# Patient Record
Sex: FEMALE | Race: WHITE | NOT HISPANIC OR LATINO | URBAN - METROPOLITAN AREA
[De-identification: names, ages, dates, MRNs, and addresses within clinical notes are randomized per-mention and may not be internally consistent; named-entity substitution may affect disease eponyms.]

---

## 2018-03-17 ENCOUNTER — EMERGENCY (EMERGENCY)
Facility: HOSPITAL | Age: 48
LOS: 0 days | Discharge: ANOTHER HOSPITAL | End: 2018-03-18
Attending: EMERGENCY MEDICINE
Payer: COMMERCIAL

## 2018-03-17 VITALS
SYSTOLIC BLOOD PRESSURE: 186 MMHG | DIASTOLIC BLOOD PRESSURE: 107 MMHG | OXYGEN SATURATION: 98 % | HEIGHT: 61 IN | HEART RATE: 114 BPM | WEIGHT: 130.07 LBS | RESPIRATION RATE: 21 BRPM
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
## 2018-03-17 DIAGNOSIS — R45.851 SUICIDAL IDEATIONS: ICD-10-CM

## 2018-03-17 DIAGNOSIS — R69 ILLNESS, UNSPECIFIED: ICD-10-CM

## 2018-03-17 DIAGNOSIS — F32.9 MAJOR DEPRESSIVE DISORDER, SINGLE EPISODE, UNSPECIFIED: ICD-10-CM

## 2018-03-17 DIAGNOSIS — F11.11 OPIOID ABUSE, IN REMISSION: ICD-10-CM

## 2018-03-17 DIAGNOSIS — F33.2 MAJOR DEPRESSIVE DISORDER, RECURRENT SEVERE WITHOUT PSYCHOTIC FEATURES: ICD-10-CM

## 2018-03-17 DIAGNOSIS — F12.10 CANNABIS ABUSE, UNCOMPLICATED: ICD-10-CM

## 2018-03-17 DIAGNOSIS — S61.512A LACERATION WITHOUT FOREIGN BODY OF LEFT WRIST, INITIAL ENCOUNTER: ICD-10-CM

## 2018-03-17 LAB
ALBUMIN SERPL ELPH-MCNC: 3.9 G/DL — SIGNIFICANT CHANGE UP (ref 3.3–5)
ALP SERPL-CCNC: 79 U/L — SIGNIFICANT CHANGE UP (ref 40–120)
ALT FLD-CCNC: 27 U/L — SIGNIFICANT CHANGE UP (ref 12–78)
AMPHET UR-MCNC: NEGATIVE — SIGNIFICANT CHANGE UP
ANION GAP SERPL CALC-SCNC: 8 MMOL/L — SIGNIFICANT CHANGE UP (ref 5–17)
APAP SERPL-MCNC: <2 UG/ML — LOW (ref 10–30)
AST SERPL-CCNC: 21 U/L — SIGNIFICANT CHANGE UP (ref 15–37)
BARBITURATES UR SCN-MCNC: NEGATIVE — SIGNIFICANT CHANGE UP
BASOPHILS # BLD AUTO: 0.03 K/UL — SIGNIFICANT CHANGE UP (ref 0–0.2)
BASOPHILS NFR BLD AUTO: 0.2 % — SIGNIFICANT CHANGE UP (ref 0–2)
BENZODIAZ UR-MCNC: NEGATIVE — SIGNIFICANT CHANGE UP
BILIRUB SERPL-MCNC: 0.2 MG/DL — SIGNIFICANT CHANGE UP (ref 0.2–1.2)
BUN SERPL-MCNC: 6 MG/DL — LOW (ref 7–23)
CALCIUM SERPL-MCNC: 8.9 MG/DL — SIGNIFICANT CHANGE UP (ref 8.5–10.1)
CHLORIDE SERPL-SCNC: 107 MMOL/L — SIGNIFICANT CHANGE UP (ref 96–108)
CO2 SERPL-SCNC: 28 MMOL/L — SIGNIFICANT CHANGE UP (ref 22–31)
COCAINE METAB.OTHER UR-MCNC: NEGATIVE — SIGNIFICANT CHANGE UP
CREAT SERPL-MCNC: 0.74 MG/DL — SIGNIFICANT CHANGE UP (ref 0.5–1.3)
EOSINOPHIL # BLD AUTO: 0.08 K/UL — SIGNIFICANT CHANGE UP (ref 0–0.5)
EOSINOPHIL NFR BLD AUTO: 0.7 % — SIGNIFICANT CHANGE UP (ref 0–6)
ETHANOL SERPL-MCNC: <10 MG/DL — SIGNIFICANT CHANGE UP (ref 0–10)
GLUCOSE SERPL-MCNC: 65 MG/DL — LOW (ref 70–99)
HCG SERPL-ACNC: <1 MIU/ML — SIGNIFICANT CHANGE UP
HCG UR QL: NEGATIVE — SIGNIFICANT CHANGE UP
HCT VFR BLD CALC: 42.4 % — SIGNIFICANT CHANGE UP (ref 34.5–45)
HGB BLD-MCNC: 14.6 G/DL — SIGNIFICANT CHANGE UP (ref 11.5–15.5)
IMM GRANULOCYTES NFR BLD AUTO: 0.4 % — SIGNIFICANT CHANGE UP (ref 0–1.5)
LYMPHOCYTES # BLD AUTO: 2.88 K/UL — SIGNIFICANT CHANGE UP (ref 1–3.3)
LYMPHOCYTES # BLD AUTO: 23.9 % — SIGNIFICANT CHANGE UP (ref 13–44)
MCHC RBC-ENTMCNC: 30.8 PG — SIGNIFICANT CHANGE UP (ref 27–34)
MCHC RBC-ENTMCNC: 34.4 GM/DL — SIGNIFICANT CHANGE UP (ref 32–36)
MCV RBC AUTO: 89.5 FL — SIGNIFICANT CHANGE UP (ref 80–100)
METHADONE UR-MCNC: NEGATIVE — SIGNIFICANT CHANGE UP
MONOCYTES # BLD AUTO: 1.16 K/UL — HIGH (ref 0–0.9)
MONOCYTES NFR BLD AUTO: 9.6 % — SIGNIFICANT CHANGE UP (ref 2–14)
NEUTROPHILS # BLD AUTO: 7.86 K/UL — HIGH (ref 1.8–7.4)
NEUTROPHILS NFR BLD AUTO: 65.2 % — SIGNIFICANT CHANGE UP (ref 43–77)
NRBC # BLD: 0 /100 WBCS — SIGNIFICANT CHANGE UP (ref 0–0)
OPIATES UR-MCNC: NEGATIVE — SIGNIFICANT CHANGE UP
PCP SPEC-MCNC: SIGNIFICANT CHANGE UP
PCP UR-MCNC: NEGATIVE — SIGNIFICANT CHANGE UP
PLATELET # BLD AUTO: 300 K/UL — SIGNIFICANT CHANGE UP (ref 150–400)
POTASSIUM SERPL-MCNC: 4.2 MMOL/L — SIGNIFICANT CHANGE UP (ref 3.5–5.3)
POTASSIUM SERPL-SCNC: 4.2 MMOL/L — SIGNIFICANT CHANGE UP (ref 3.5–5.3)
PROT SERPL-MCNC: 7.9 GM/DL — SIGNIFICANT CHANGE UP (ref 6–8.3)
RBC # BLD: 4.74 M/UL — SIGNIFICANT CHANGE UP (ref 3.8–5.2)
RBC # FLD: 14 % — SIGNIFICANT CHANGE UP (ref 10.3–14.5)
SALICYLATES SERPL-MCNC: 5.7 MG/DL — SIGNIFICANT CHANGE UP (ref 2.8–20)
SODIUM SERPL-SCNC: 143 MMOL/L — SIGNIFICANT CHANGE UP (ref 135–145)
THC UR QL: POSITIVE — SIGNIFICANT CHANGE UP
WBC # BLD: 12.06 K/UL — HIGH (ref 3.8–10.5)
WBC # FLD AUTO: 12.06 K/UL — HIGH (ref 3.8–10.5)

## 2018-03-17 PROCEDURE — 99285 EMERGENCY DEPT VISIT HI MDM: CPT | Mod: 25

## 2018-03-17 PROCEDURE — 12002 RPR S/N/AX/GEN/TRNK2.6-7.5CM: CPT

## 2018-03-17 PROCEDURE — 90792 PSYCH DIAG EVAL W/MED SRVCS: CPT | Mod: GT

## 2018-03-17 NOTE — ED PROVIDER NOTE - OBJECTIVE STATEMENT
Pt is a 48 yo lady with a pmhx of depression on zoloft who presents to the ED after a suicidal attempt. Has been going through divorce and today cut her L. wrist. No hx of cutting before in the past. Tearful affect, does not respond to questioning regarding if this was suicidal attempt or just hurting herself. Is up to date on tdap. No weakness, no numbness or tingling of hand. Is R. handed.

## 2018-03-17 NOTE — ED BEHAVIORAL HEALTH ASSESSMENT NOTE - SUICIDE RISK FACTORS
Access to means (pills, firearms, etc.)/Hopelessness/Mood episode/Highly impulsive behavior/Substance abuse/dependence/Family history of suicide

## 2018-03-17 NOTE — ED BEHAVIORAL HEALTH ASSESSMENT NOTE - HPI (INCLUDE ILLNESS QUALITY, SEVERITY, DURATION, TIMING, CONTEXT, MODIFYING FACTORS, ASSOCIATED SIGNS AND SYMPTOMS)
48 y/o , unemployed,  female, domiciled with  and 15 year old son, with hx of depression, anxiety, substance abuse (opiates, heroin - on Suboxone currently, cannabis) and one OD on pills for which she was hospitalized at [facility], with family hx of completed suicide (paternal aunt shot herself), on Lexapro 20 mg/day prescribed by her PMD, not in any other treatment at this time, BIB EMS after she cut her wrist with a razor, requiring sutures, in setting of her marriage ending. Today pt and her  were at home, and as he was heading out the door to go to work, she yelled out to him that she had cut herself.    On exam pt presents sad and distraught, frequently tearful. States she and her  are undergoing a divorce. It was his decision to end the marriage and pt does not want to separate. Today she was crying and begged him to not leave her, and when he told her no, she impulsively took a razor and cut herself on her wrist. (Note: Pt's son was not at the home when this occurred.) Pt states she doesn't remember why she did this and that she can't remember the details. What she remembers is feeling profoundly sad and lonely. She does not think she was trying to kill herself thought and states she doesn't want to die. States she and her  of 19 years started having problems when she cheated on him twice about two years ago. Pt blames herself entirely, stating repeatedly that it is her fault and describing the  as "sweet" and very supportive. Endorses multiple depressive sx and states she is having "hard time functioning." She lost her job of 22 years last year and has not been able to find one yet. She is currently trying to obtain a job with [employer] but just failed one of the licensing exams. She's been complaint with Lexapro, which she's been on for 2 years now. She feels it had a noticeable effect when she first started taking it but feels it has lost its effect. She feels a medication change is necessary.     No current or prior hx of psychosis. Currently using marijuana intermittently, with last use being 2 days ago. Hx of abusing prescription pain pills and is now on Suboxone. Also hx of abusing EtOH but stopped using it altogether a year ago. When asked about prior suicide attempts. pt denies. When asked about the OD in 10/2016 (Xanax, Ambien, and possibly barbituates), which per records also occurred in similar setting (impending divorce), pt states she wasn't trying to kill herself and that she was just "trying to sleep."    Please see below collateral obtained by BT[facility] from pt's  (Kahlil Duarte ):  Collateral reports that patient was at baseline all day until he was preparing to leave for work. Collateral reports patient used a knife to cut her left wrist and required 6 stitches. Collateral reports patient “did this to get my attention but now her parents are involved so this should get better. Collateral denies concerns for patient safety and doesn’t believe patient is in need of hospitalization, collateral plans to  go forward with divorce and understands this stressor could increase patient symptoms. 46 y/o , unemployed,  female, domiciled with  and 15 year old son, with hx of depression, anxiety, substance abuse (opiates, heroin - on Suboxone currently, cannabis) and one OD on pills for which she was hospitalized at [facility], with family hx of completed suicide (paternal aunt shot herself), on Lexapro 20 mg/day prescribed by her PMD, not in any other treatment at this time, BIB EMS after she cut her wrist with a razor, requiring sutures, in setting of her marriage ending. Today pt and her  were at home, and as he was heading out the door to go to work, she yelled out to him that she had cut herself.    On exam pt presents sad and distraught, frequently tearful. States she and her  are undergoing a divorce. It was his decision to end the marriage and pt does not want to separate. Today she was crying and begged him to not leave her, and when he told her no, she impulsively took a razor and cut herself on her wrist. (Note: Pt's son was not at the home when this occurred.) Pt states she doesn't remember why she did this and that she can't remember the details. What she remembers is feeling profoundly sad and lonely. She does not think she was trying to kill herself though and emphasizes that she doesn't want to die. States she and her  of 19 years started having problems when she cheated on him twice about two years ago. Pt blames herself entirely, stating repeatedly that it is all her fault and describing the  as "sweet" and very supportive. Endorses multiple depressive sx and states she is having "hard time functioning." She lost her job of 22 years last year and has not been able to find one yet. She is currently trying to obtain a job with [employer] company but just failed one of the licensing exams. She's been complaint with Lexapro, which she's been on for 2 years now. She feels it had a noticeable effect when she first started taking it but feels it has lost its effect. She feels a medication change is necessary.     No current or prior hx of psychosis. Currently using marijuana intermittently, with last use being 2 days ago. Hx of abusing prescription pain pills and is now on Suboxone. Also hx of abusing EtOH but stopped using it altogether a year ago. When asked about prior suicide attempts. pt denies. When asked about the OD in 10/2016 (Xanax, Ambien, and possibly barbituates), which per records also occurred in similar setting (impending divorce), pt states she wasn't trying to kill herself and that she was just "trying to sleep."    Please see below collateral obtained by BTCM from pt's  (Kahlil Duarte ):  Collateral reports that patient was at baseline all day until he was preparing to leave for work. Collateral reports patient used a knife to cut her left wrist and required 6 stitches. Collateral reports patient “did this to get my attention but now her parents are involved so this should get better. Collateral denies concerns for patient safety and doesn’t believe patient is in need of hospitalization, collateral plans to  go forward with divorce and understands this stressor could increase patient symptoms.

## 2018-03-17 NOTE — ED ADULT TRIAGE NOTE - OTHER COMPLAINTS
patient crying/distraught patient crying/distraught. Patient placed on 1:1 patient crying/distraught. Patient placed on 1:1. large lac to left wrist

## 2018-03-17 NOTE — ED ADULT NURSE NOTE - OBJECTIVE STATEMENT
Lac to left wrist. Patient distraught crying refusing to answer questions. " I just can't take it. " Avoids eye contact, crying inconsolable/attempted to hurt self as per

## 2018-03-17 NOTE — ED ADULT TRIAGE NOTE - CHIEF COMPLAINT QUOTE
Lac to left wrist. Patient distraught crying refusing to answer questions. " I just can't take it. " Avoids eye contact, crying inconsolable Lac to left wrist. Patient distraught crying refusing to answer questions. " I just can't take it. " Avoids eye contact, crying inconsolable/attempted to hurt self as per

## 2018-03-17 NOTE — ED BEHAVIORAL HEALTH ASSESSMENT NOTE - PSYCHIATRIC ISSUES AND PLAN (INCLUDE STANDING AND PRN MEDICATION)
Depressed and cut self on wrist today; continue Lexapro 20 mg for now; will defer to primary team regarding anti-depressant switch. No need for 1:1; pt is regretful, denying active SI and verbalizes willingness to reach out to staff for help.

## 2018-03-17 NOTE — ED BEHAVIORAL HEALTH ASSESSMENT NOTE - RISK ASSESSMENT
Acute safety concerns. Impulsively cut herself today. Similar behavior in 2016 when she overdosed on pills (same stressor - impending divorce).

## 2018-03-17 NOTE — ED BEHAVIORAL HEALTH ASSESSMENT NOTE - DESCRIPTION
Very tearful and sad but cooperative. No behavioral issues.     Vital Signs Last 24 Hrs  T(C): --  T(F): --  HR: 114 (17 Mar 2018 15:43) (114 - 114)  BP: 186/107 (17 Mar 2018 15:43) (186/107 - 186/107)  BP(mean): --  RR: 21 (17 Mar 2018 15:43) (21 - 21)  SpO2: 98% (17 Mar 2018 15:43) (98% - 98%) Hx of arthritis? (on Lyrica currently) Lives with  of 19 years and their 15 y/o son. Was working in a same company for 22 years until last year when she was terminated. Currently looking for a job.

## 2018-03-17 NOTE — ED BEHAVIORAL HEALTH ASSESSMENT NOTE - DESCRIPTION (FIRST USE, LAST USE, QUANTITY, FREQUENCY, DURATION)
Quit one year ago; prior to that was "spending a lot of time in bars and crossed the boundaries few times." Ocassional use Hx of abusing prescription pills - now on Suboxone

## 2018-03-17 NOTE — ED BEHAVIORAL HEALTH ASSESSMENT NOTE - DETAILS
Unclear intent. Pt denies what happened today and the OD on pills in 2016 were suicidal in intent. But both events occurred in context of depression and significant stressor. Paternal aunt - suffered from depression; completed suicide using a gun Will speak with resident on call Spoke with

## 2018-03-17 NOTE — ED BEHAVIORAL HEALTH ASSESSMENT NOTE - NS ED [facility] PLAN ADMIT TO PSYCHIATRY BH CONTACTED FT
No  provider. But left msg with Dr. Arron Bruce ([phone]) who is prescribing pt's psych medication.

## 2018-03-17 NOTE — ED BEHAVIORAL HEALTH ASSESSMENT NOTE - SUMMARY
46 y/o female, living with  and their 15 y/o son, with hx of depression, anxiety and substance abuse, hospitalized one time in 2016 after overdosing on pills, on Lexapro 20 mg currently prescribed by PMD, BIB EMS after she impulsively cut herself on her wrist in setting of her  leaving her. The cut was deep enough to require sutures and pt presents very sad, tearful and highly distressed. as well as wrought with guilt. Though she denies this was a suicide attempt, she presents as extremely fragile emotionally and her stressor is a major one (ending of 19 year marriage). She is an acute risk to self at this time and hospitalization is required. Admit on involuntary basis.

## 2018-03-17 NOTE — ED PROVIDER NOTE - MEDICAL DECISION MAKING DETAILS
Ddx: suicidal attempt vs cutting for attention/ compulsion/ depression  Plan: cbc, cmp, ua utox, hcg, psych consult

## 2018-03-18 ENCOUNTER — INPATIENT (INPATIENT)
Facility: HOSPITAL | Age: 48
LOS: 4 days | Discharge: HOME | End: 2018-03-23
Attending: PSYCHIATRY & NEUROLOGY | Admitting: PSYCHIATRY & NEUROLOGY
Payer: COMMERCIAL

## 2018-03-18 VITALS
DIASTOLIC BLOOD PRESSURE: 78 MMHG | TEMPERATURE: 98 F | HEART RATE: 69 BPM | RESPIRATION RATE: 16 BRPM | OXYGEN SATURATION: 97 % | SYSTOLIC BLOOD PRESSURE: 126 MMHG

## 2018-03-18 VITALS — HEART RATE: 84 BPM | TEMPERATURE: 98 F | DIASTOLIC BLOOD PRESSURE: 81 MMHG | SYSTOLIC BLOOD PRESSURE: 119 MMHG

## 2018-03-18 DIAGNOSIS — F33.9 MAJOR DEPRESSIVE DISORDER, RECURRENT, UNSPECIFIED: ICD-10-CM

## 2018-03-18 PROCEDURE — 99222 1ST HOSP IP/OBS MODERATE 55: CPT

## 2018-03-18 RX ORDER — ESCITALOPRAM OXALATE 10 MG/1
20 TABLET, FILM COATED ORAL DAILY
Qty: 0 | Refills: 0 | Status: DISCONTINUED | OUTPATIENT
Start: 2018-03-18 | End: 2018-03-18

## 2018-03-18 RX ORDER — LANOLIN ALCOHOL/MO/W.PET/CERES
3 CREAM (GRAM) TOPICAL ONCE
Qty: 0 | Refills: 0 | Status: COMPLETED | OUTPATIENT
Start: 2018-03-18 | End: 2018-03-18

## 2018-03-18 RX ORDER — FLUOXETINE HCL 10 MG
10 CAPSULE ORAL DAILY
Qty: 0 | Refills: 0 | Status: DISCONTINUED | OUTPATIENT
Start: 2018-03-18 | End: 2018-03-18

## 2018-03-18 RX ORDER — BUPRENORPHINE AND NALOXONE 2; .5 MG/1; MG/1
1 TABLET SUBLINGUAL DAILY
Qty: 0 | Refills: 0 | Status: DISCONTINUED | OUTPATIENT
Start: 2018-03-18 | End: 2018-03-23

## 2018-03-18 RX ORDER — IBUPROFEN 200 MG
600 TABLET ORAL EVERY 6 HOURS
Qty: 0 | Refills: 0 | Status: DISCONTINUED | OUTPATIENT
Start: 2018-03-18 | End: 2018-03-23

## 2018-03-18 RX ORDER — NICOTINE POLACRILEX 2 MG
1 GUM BUCCAL EVERY 4 HOURS
Qty: 0 | Refills: 0 | Status: DISCONTINUED | OUTPATIENT
Start: 2018-03-18 | End: 2018-03-23

## 2018-03-18 RX ORDER — BUPRENORPHINE AND NALOXONE 2; .5 MG/1; MG/1
2 TABLET SUBLINGUAL DAILY
Qty: 0 | Refills: 0 | Status: DISCONTINUED | OUTPATIENT
Start: 2018-03-18 | End: 2018-03-18

## 2018-03-18 RX ORDER — LANOLIN ALCOHOL/MO/W.PET/CERES
3 CREAM (GRAM) TOPICAL AT BEDTIME
Qty: 0 | Refills: 0 | Status: DISCONTINUED | OUTPATIENT
Start: 2018-03-18 | End: 2018-03-21

## 2018-03-18 RX ORDER — NICOTINE POLACRILEX 2 MG
1 GUM BUCCAL DAILY
Qty: 0 | Refills: 0 | Status: DISCONTINUED | OUTPATIENT
Start: 2018-03-18 | End: 2018-03-23

## 2018-03-18 RX ORDER — DIPHENHYDRAMINE HCL 50 MG
50 CAPSULE ORAL ONCE
Qty: 0 | Refills: 0 | Status: COMPLETED | OUTPATIENT
Start: 2018-03-18 | End: 2018-03-18

## 2018-03-18 RX ORDER — HALOPERIDOL DECANOATE 100 MG/ML
2 INJECTION INTRAMUSCULAR ONCE
Qty: 0 | Refills: 0 | Status: DISCONTINUED | OUTPATIENT
Start: 2018-03-18 | End: 2018-03-23

## 2018-03-18 RX ORDER — HYDROXYZINE HCL 10 MG
50 TABLET ORAL EVERY 6 HOURS
Qty: 0 | Refills: 0 | Status: DISCONTINUED | OUTPATIENT
Start: 2018-03-18 | End: 2018-03-23

## 2018-03-18 RX ORDER — FLUOXETINE HCL 10 MG
10 CAPSULE ORAL DAILY
Qty: 0 | Refills: 0 | Status: DISCONTINUED | OUTPATIENT
Start: 2018-03-19 | End: 2018-03-20

## 2018-03-18 RX ADMIN — ESCITALOPRAM OXALATE 20 MILLIGRAM(S): 10 TABLET, FILM COATED ORAL at 09:20

## 2018-03-18 RX ADMIN — Medication 1 EACH: at 15:44

## 2018-03-18 RX ADMIN — Medication 200 MILLIGRAM(S): at 16:41

## 2018-03-18 RX ADMIN — BUPRENORPHINE AND NALOXONE 1 TABLET(S): 2; .5 TABLET SUBLINGUAL at 09:00

## 2018-03-18 RX ADMIN — BUPRENORPHINE AND NALOXONE 1 TABLET(S): 2; .5 TABLET SUBLINGUAL at 09:20

## 2018-03-18 RX ADMIN — Medication 200 MILLIGRAM(S): at 09:20

## 2018-03-18 RX ADMIN — Medication 1 PATCH: at 09:20

## 2018-03-18 RX ADMIN — Medication 3 MILLIGRAM(S): at 21:35

## 2018-03-18 RX ADMIN — Medication 50 MILLIGRAM(S): at 21:35

## 2018-03-18 RX ADMIN — Medication 600 MILLIGRAM(S): at 10:42

## 2018-03-19 PROCEDURE — 99232 SBSQ HOSP IP/OBS MODERATE 35: CPT | Mod: 25

## 2018-03-19 PROCEDURE — 90834 PSYTX W PT 45 MINUTES: CPT | Mod: 59

## 2018-03-19 PROCEDURE — 90853 GROUP PSYCHOTHERAPY: CPT

## 2018-03-19 RX ORDER — TRAZODONE HCL 50 MG
50 TABLET ORAL AT BEDTIME
Qty: 0 | Refills: 0 | Status: DISCONTINUED | OUTPATIENT
Start: 2018-03-19 | End: 2018-03-21

## 2018-03-19 RX ADMIN — Medication 1 PATCH: at 09:26

## 2018-03-19 RX ADMIN — Medication 50 MILLIGRAM(S): at 21:01

## 2018-03-19 RX ADMIN — Medication 200 MILLIGRAM(S): at 15:32

## 2018-03-19 RX ADMIN — Medication 200 MILLIGRAM(S): at 09:26

## 2018-03-19 RX ADMIN — Medication 50 MILLIGRAM(S): at 21:04

## 2018-03-19 RX ADMIN — BUPRENORPHINE AND NALOXONE 1 TABLET(S): 2; .5 TABLET SUBLINGUAL at 09:10

## 2018-03-19 RX ADMIN — Medication 3 MILLIGRAM(S): at 21:01

## 2018-03-19 RX ADMIN — BUPRENORPHINE AND NALOXONE 1 TABLET(S): 2; .5 TABLET SUBLINGUAL at 09:26

## 2018-03-19 RX ADMIN — Medication 10 MILLIGRAM(S): at 09:26

## 2018-03-20 PROCEDURE — 99232 SBSQ HOSP IP/OBS MODERATE 35: CPT

## 2018-03-20 RX ORDER — FLUOXETINE HCL 10 MG
20 CAPSULE ORAL DAILY
Qty: 0 | Refills: 0 | Status: DISCONTINUED | OUTPATIENT
Start: 2018-03-21 | End: 2018-03-23

## 2018-03-20 RX ADMIN — Medication 200 MILLIGRAM(S): at 15:39

## 2018-03-20 RX ADMIN — Medication 50 MILLIGRAM(S): at 11:26

## 2018-03-20 RX ADMIN — Medication 1 PATCH: at 08:58

## 2018-03-20 RX ADMIN — Medication 200 MILLIGRAM(S): at 08:58

## 2018-03-20 RX ADMIN — Medication 50 MILLIGRAM(S): at 21:04

## 2018-03-20 RX ADMIN — Medication 3 MILLIGRAM(S): at 21:04

## 2018-03-20 RX ADMIN — Medication 10 MILLIGRAM(S): at 08:58

## 2018-03-20 RX ADMIN — Medication 50 MILLIGRAM(S): at 21:05

## 2018-03-20 RX ADMIN — BUPRENORPHINE AND NALOXONE 1 TABLET(S): 2; .5 TABLET SUBLINGUAL at 10:00

## 2018-03-20 RX ADMIN — Medication 1 EACH: at 08:59

## 2018-03-20 RX ADMIN — BUPRENORPHINE AND NALOXONE 1 TABLET(S): 2; .5 TABLET SUBLINGUAL at 08:58

## 2018-03-21 PROCEDURE — 99232 SBSQ HOSP IP/OBS MODERATE 35: CPT | Mod: 25

## 2018-03-21 RX ORDER — LANOLIN ALCOHOL/MO/W.PET/CERES
3 CREAM (GRAM) TOPICAL AT BEDTIME
Qty: 0 | Refills: 0 | Status: DISCONTINUED | OUTPATIENT
Start: 2018-03-21 | End: 2018-03-23

## 2018-03-21 RX ORDER — TRAZODONE HCL 50 MG
100 TABLET ORAL AT BEDTIME
Qty: 0 | Refills: 0 | Status: DISCONTINUED | OUTPATIENT
Start: 2018-03-21 | End: 2018-03-23

## 2018-03-21 RX ORDER — DOCUSATE SODIUM 100 MG
100 CAPSULE ORAL
Qty: 0 | Refills: 0 | Status: DISCONTINUED | OUTPATIENT
Start: 2018-03-21 | End: 2018-03-23

## 2018-03-21 RX ADMIN — Medication 100 MILLIGRAM(S): at 21:05

## 2018-03-21 RX ADMIN — Medication 1 PATCH: at 09:00

## 2018-03-21 RX ADMIN — Medication 200 MILLIGRAM(S): at 09:00

## 2018-03-21 RX ADMIN — Medication 20 MILLIGRAM(S): at 09:00

## 2018-03-21 RX ADMIN — Medication 200 MILLIGRAM(S): at 14:57

## 2018-03-21 RX ADMIN — BUPRENORPHINE AND NALOXONE 1 TABLET(S): 2; .5 TABLET SUBLINGUAL at 10:00

## 2018-03-21 RX ADMIN — BUPRENORPHINE AND NALOXONE 1 TABLET(S): 2; .5 TABLET SUBLINGUAL at 09:00

## 2018-03-21 RX ADMIN — Medication 50 MILLIGRAM(S): at 21:05

## 2018-03-22 PROCEDURE — 90853 GROUP PSYCHOTHERAPY: CPT

## 2018-03-22 PROCEDURE — 99232 SBSQ HOSP IP/OBS MODERATE 35: CPT | Mod: 25

## 2018-03-22 RX ORDER — TRAZODONE HCL 50 MG
1 TABLET ORAL
Qty: 0 | Refills: 0 | COMMUNITY
Start: 2018-03-22

## 2018-03-22 RX ORDER — BUPRENORPHINE AND NALOXONE 2; .5 MG/1; MG/1
1 TABLET SUBLINGUAL
Qty: 0 | Refills: 0 | COMMUNITY
Start: 2018-03-22

## 2018-03-22 RX ORDER — FLUOXETINE HCL 10 MG
1 CAPSULE ORAL
Qty: 0 | Refills: 0 | COMMUNITY
Start: 2018-03-22

## 2018-03-22 RX ORDER — TRAZODONE HCL 50 MG
1 TABLET ORAL
Qty: 15 | Refills: 0 | OUTPATIENT
Start: 2018-03-22

## 2018-03-22 RX ORDER — HYDROXYZINE HCL 10 MG
1 TABLET ORAL
Qty: 30 | Refills: 0 | OUTPATIENT
Start: 2018-03-22

## 2018-03-22 RX ORDER — FLUOXETINE HCL 10 MG
1 CAPSULE ORAL
Qty: 15 | Refills: 0 | OUTPATIENT
Start: 2018-03-22

## 2018-03-22 RX ADMIN — Medication 1 EACH: at 09:52

## 2018-03-22 RX ADMIN — Medication 1 PATCH: at 08:35

## 2018-03-22 RX ADMIN — BUPRENORPHINE AND NALOXONE 1 TABLET(S): 2; .5 TABLET SUBLINGUAL at 08:35

## 2018-03-22 RX ADMIN — Medication 100 MILLIGRAM(S): at 08:35

## 2018-03-22 RX ADMIN — Medication 50 MILLIGRAM(S): at 09:50

## 2018-03-22 RX ADMIN — Medication 200 MILLIGRAM(S): at 08:35

## 2018-03-22 RX ADMIN — BUPRENORPHINE AND NALOXONE 1 TABLET(S): 2; .5 TABLET SUBLINGUAL at 09:38

## 2018-03-22 RX ADMIN — Medication 20 MILLIGRAM(S): at 08:35

## 2018-03-22 RX ADMIN — Medication 100 MILLIGRAM(S): at 20:47

## 2018-03-22 RX ADMIN — Medication 50 MILLIGRAM(S): at 20:47

## 2018-03-22 RX ADMIN — Medication 200 MILLIGRAM(S): at 14:48

## 2018-03-23 VITALS — TEMPERATURE: 98 F | DIASTOLIC BLOOD PRESSURE: 80 MMHG | HEART RATE: 81 BPM | SYSTOLIC BLOOD PRESSURE: 120 MMHG

## 2018-03-23 PROCEDURE — 90832 PSYTX W PT 30 MINUTES: CPT

## 2018-03-23 RX ADMIN — BUPRENORPHINE AND NALOXONE 1 TABLET(S): 2; .5 TABLET SUBLINGUAL at 08:39

## 2018-03-23 RX ADMIN — Medication 200 MILLIGRAM(S): at 08:39

## 2018-03-23 RX ADMIN — Medication 20 MILLIGRAM(S): at 08:39

## 2018-03-23 RX ADMIN — Medication 1 PATCH: at 08:39
